# Patient Record
Sex: MALE | Race: WHITE | NOT HISPANIC OR LATINO | Employment: FULL TIME | URBAN - METROPOLITAN AREA
[De-identification: names, ages, dates, MRNs, and addresses within clinical notes are randomized per-mention and may not be internally consistent; named-entity substitution may affect disease eponyms.]

---

## 2023-04-17 ENCOUNTER — OFFICE VISIT (OUTPATIENT)
Dept: FAMILY MEDICINE | Facility: CLINIC | Age: 43
End: 2023-04-17
Payer: COMMERCIAL

## 2023-04-17 ENCOUNTER — LAB VISIT (OUTPATIENT)
Dept: LAB | Facility: HOSPITAL | Age: 43
End: 2023-04-17
Attending: FAMILY MEDICINE
Payer: COMMERCIAL

## 2023-04-17 VITALS
BODY MASS INDEX: 28.01 KG/M2 | OXYGEN SATURATION: 98 % | HEART RATE: 56 BPM | WEIGHT: 189.13 LBS | HEIGHT: 69 IN | TEMPERATURE: 99 F | SYSTOLIC BLOOD PRESSURE: 124 MMHG | DIASTOLIC BLOOD PRESSURE: 86 MMHG

## 2023-04-17 DIAGNOSIS — R53.83 FATIGUE, UNSPECIFIED TYPE: ICD-10-CM

## 2023-04-17 DIAGNOSIS — N63.41 SUBAREOLAR MASS OF RIGHT BREAST: Primary | ICD-10-CM

## 2023-04-17 DIAGNOSIS — G47.00 INSOMNIA, UNSPECIFIED TYPE: ICD-10-CM

## 2023-04-17 LAB — TSH SERPL DL<=0.005 MIU/L-ACNC: 2.14 UIU/ML (ref 0.4–4)

## 2023-04-17 PROCEDURE — 99204 PR OFFICE/OUTPT VISIT, NEW, LEVL IV, 45-59 MIN: ICD-10-PCS | Mod: S$GLB,,, | Performed by: FAMILY MEDICINE

## 2023-04-17 PROCEDURE — 3008F BODY MASS INDEX DOCD: CPT | Mod: CPTII,S$GLB,, | Performed by: FAMILY MEDICINE

## 2023-04-17 PROCEDURE — 36415 COLL VENOUS BLD VENIPUNCTURE: CPT | Mod: PO | Performed by: FAMILY MEDICINE

## 2023-04-17 PROCEDURE — 3079F DIAST BP 80-89 MM HG: CPT | Mod: CPTII,S$GLB,, | Performed by: FAMILY MEDICINE

## 2023-04-17 PROCEDURE — 3008F PR BODY MASS INDEX (BMI) DOCUMENTED: ICD-10-PCS | Mod: CPTII,S$GLB,, | Performed by: FAMILY MEDICINE

## 2023-04-17 PROCEDURE — 1159F MED LIST DOCD IN RCRD: CPT | Mod: CPTII,S$GLB,, | Performed by: FAMILY MEDICINE

## 2023-04-17 PROCEDURE — 3074F PR MOST RECENT SYSTOLIC BLOOD PRESSURE < 130 MM HG: ICD-10-PCS | Mod: CPTII,S$GLB,, | Performed by: FAMILY MEDICINE

## 2023-04-17 PROCEDURE — 84443 ASSAY THYROID STIM HORMONE: CPT | Performed by: FAMILY MEDICINE

## 2023-04-17 PROCEDURE — 99999 PR PBB SHADOW E&M-NEW PATIENT-LVL IV: CPT | Mod: PBBFAC,,, | Performed by: FAMILY MEDICINE

## 2023-04-17 PROCEDURE — 3079F PR MOST RECENT DIASTOLIC BLOOD PRESSURE 80-89 MM HG: ICD-10-PCS | Mod: CPTII,S$GLB,, | Performed by: FAMILY MEDICINE

## 2023-04-17 PROCEDURE — 84403 ASSAY OF TOTAL TESTOSTERONE: CPT | Performed by: FAMILY MEDICINE

## 2023-04-17 PROCEDURE — 99999 PR PBB SHADOW E&M-NEW PATIENT-LVL IV: ICD-10-PCS | Mod: PBBFAC,,, | Performed by: FAMILY MEDICINE

## 2023-04-17 PROCEDURE — 99204 OFFICE O/P NEW MOD 45 MIN: CPT | Mod: S$GLB,,, | Performed by: FAMILY MEDICINE

## 2023-04-17 PROCEDURE — 1159F PR MEDICATION LIST DOCUMENTED IN MEDICAL RECORD: ICD-10-PCS | Mod: CPTII,S$GLB,, | Performed by: FAMILY MEDICINE

## 2023-04-17 PROCEDURE — 3074F SYST BP LT 130 MM HG: CPT | Mod: CPTII,S$GLB,, | Performed by: FAMILY MEDICINE

## 2023-04-17 RX ORDER — MELOXICAM 15 MG/1
15 TABLET ORAL
COMMUNITY
Start: 2023-04-11

## 2023-04-17 RX ORDER — METHYLPREDNISOLONE 4 MG/1
TABLET ORAL
COMMUNITY
Start: 2023-04-11

## 2023-04-17 NOTE — PROGRESS NOTES
Subjective:       Patient ID: Braydon Cooley is a 42 y.o. male.    Chief Complaint: Establish Care (Pt c/o painful lump in chest.)      HPI Comments:       Current Outpatient Medications:     meloxicam (MOBIC) 15 MG tablet, Take 15 mg by mouth., Disp: , Rfl:     methylPREDNISolone (MEDROL DOSEPACK) 4 mg tablet, FOLLOW PACKAGE DIRECTIONS, Disp: , Rfl:     This my 1st time seeing this patient.  He would like to establish care.  He has a history of melanoma    Complains of a 2 month history of painful lump bright behind his right nipple.  No drainage.  Pain her is off and on.  Thought he had something similar when he was a teenager.      Recently had melanoma removed from his left upper arm.  Says it was all taken often there was no spread    Brought some blood work from a work physical done about 6 months ago.  Everything was okay except for mildly elevated arsenic levels.      Also complains of trouble sleeping.  Been going on for 1-2 years.  No reports of sleep apnea from his wife.  Works in long shifts has trouble sleeping at work.  But at home as well he lays awake for hours.  Feels very tired now although is generally very active.  Melatonin generally helps him fall asleep but he wakes up later.    Past medical history:  Ruptured disc repaired on C6-C7, and on L5-L6.  Vasectomy.      Family history father with sleep apnea and kidney stones.  G father with kidney cancer.  Grandmother with brain cancer.    Work physical blood work (10/22):  Blood sugar 75, uric acid 3.2, BUN 23, creatinine 1.25, normal LFTs, , normal CBC, normal urinalysis, PSA 0.7, hepatitis-C antibody negative, chest x-ray negative  Review of Systems   Constitutional:  Positive for fatigue. Negative for activity change, appetite change and fever.   HENT:  Negative for sore throat.    Respiratory:  Negative for cough and shortness of breath.    Cardiovascular:  Negative for chest pain.   Gastrointestinal:  Negative for abdominal pain, diarrhea  "and nausea.   Genitourinary:  Negative for difficulty urinating.   Musculoskeletal:  Negative for arthralgias and myalgias.   Neurological:  Negative for dizziness and headaches.   Psychiatric/Behavioral:  Positive for sleep disturbance.      Objective:      Vitals:    04/17/23 1326   BP: 124/86   Pulse: (!) 56   Temp: 98.9 °F (37.2 °C)   SpO2: 98%   Weight: 85.8 kg (189 lb 2.5 oz)   Height: 5' 9" (1.753 m)   PainSc: 0-No pain     Physical Exam  Vitals and nursing note reviewed.   Constitutional:       General: He is not in acute distress.     Appearance: He is well-developed. He is not diaphoretic.   HENT:      Head: Normocephalic.   Neck:      Thyroid: No thyromegaly.   Cardiovascular:      Rate and Rhythm: Normal rate and regular rhythm.      Heart sounds: Normal heart sounds. No murmur heard.  Pulmonary:      Effort: Pulmonary effort is normal.      Breath sounds: Normal breath sounds. No wheezing or rales.   Chest:          Comments: Palpable lump, minimally tender  No axillary or supraclavicular adenopathy  Abdominal:      General: There is no distension.      Palpations: Abdomen is soft.   Musculoskeletal:      Cervical back: Neck supple.      Right lower leg: No edema.      Left lower leg: No edema.   Lymphadenopathy:      Cervical: No cervical adenopathy.      Right cervical: No superficial or deep cervical adenopathy.     Left cervical: No superficial or deep cervical adenopathy.   Skin:     General: Skin is warm and dry.   Neurological:      Mental Status: He is alert and oriented to person, place, and time.   Psychiatric:         Mood and Affect: Mood normal.         Behavior: Behavior normal.         Thought Content: Thought content normal.         Judgment: Judgment normal.       Assessment:       1. Subareolar mass of right breast    2. Fatigue, unspecified type    3. Insomnia, unspecified type        Plan:   Subareolar mass of right breast  Comments:  Diagnostic mammogram and ultrasound  Orders:  -   "   Mammo Digital Diagnostic Right; Future; Expected date: 04/17/2023  -     US Breast Right Complete; Future; Expected date: 04/17/2023    Fatigue, unspecified type  Comments:  Recent blood work okay.  Check TSH and testosterone level today.  Most likely due to sleep deprivation  Orders:  -     TSH; Future; Expected date: 04/17/2023  -     Testosterone; Future; Expected date: 04/17/2023    Insomnia, unspecified type  Comments:  Sleep evaluation  Orders:  -     Ambulatory referral/consult to Sleep Disorders; Future; Expected date: 04/24/2023

## 2023-04-18 LAB — TESTOST SERPL-MCNC: 475 NG/DL (ref 304–1227)

## 2023-04-20 ENCOUNTER — HOSPITAL ENCOUNTER (OUTPATIENT)
Dept: RADIOLOGY | Facility: HOSPITAL | Age: 43
Discharge: HOME | End: 2023-04-20
Attending: FAMILY MEDICINE
Payer: COMMERCIAL

## 2023-04-20 ENCOUNTER — TELEPHONE (OUTPATIENT)
Dept: PULMONOLOGY | Facility: CLINIC | Age: 43
End: 2023-04-20
Payer: COMMERCIAL

## 2023-04-20 ENCOUNTER — OFFICE VISIT (OUTPATIENT)
Dept: PULMONOLOGY | Facility: CLINIC | Age: 43
End: 2023-04-20
Payer: COMMERCIAL

## 2023-04-20 VITALS
OXYGEN SATURATION: 99 % | DIASTOLIC BLOOD PRESSURE: 80 MMHG | RESPIRATION RATE: 18 BRPM | HEART RATE: 48 BPM | WEIGHT: 190.69 LBS | HEIGHT: 69 IN | BODY MASS INDEX: 28.24 KG/M2 | SYSTOLIC BLOOD PRESSURE: 124 MMHG

## 2023-04-20 DIAGNOSIS — G47.00 INSOMNIA, UNSPECIFIED TYPE: ICD-10-CM

## 2023-04-20 DIAGNOSIS — N63.41 SUBAREOLAR MASS OF RIGHT BREAST: ICD-10-CM

## 2023-04-20 DIAGNOSIS — G47.30 SLEEP-DISORDERED BREATHING: Primary | ICD-10-CM

## 2023-04-20 PROCEDURE — 1160F PR REVIEW ALL MEDS BY PRESCRIBER/CLIN PHARMACIST DOCUMENTED: ICD-10-PCS | Mod: CPTII,S$GLB,, | Performed by: PHYSICIAN ASSISTANT

## 2023-04-20 PROCEDURE — 76642 ULTRASOUND BREAST LIMITED: CPT | Mod: 26,RT,, | Performed by: RADIOLOGY

## 2023-04-20 PROCEDURE — 1159F MED LIST DOCD IN RCRD: CPT | Mod: CPTII,S$GLB,, | Performed by: PHYSICIAN ASSISTANT

## 2023-04-20 PROCEDURE — 77062 BREAST TOMOSYNTHESIS BI: CPT | Mod: TC

## 2023-04-20 PROCEDURE — 1159F PR MEDICATION LIST DOCUMENTED IN MEDICAL RECORD: ICD-10-PCS | Mod: CPTII,S$GLB,, | Performed by: PHYSICIAN ASSISTANT

## 2023-04-20 PROCEDURE — 77062 BREAST TOMOSYNTHESIS BI: CPT | Mod: 26,,, | Performed by: RADIOLOGY

## 2023-04-20 PROCEDURE — 1160F RVW MEDS BY RX/DR IN RCRD: CPT | Mod: CPTII,S$GLB,, | Performed by: PHYSICIAN ASSISTANT

## 2023-04-20 PROCEDURE — 99203 PR OFFICE/OUTPT VISIT, NEW, LEVL III, 30-44 MIN: ICD-10-PCS | Mod: S$GLB,,, | Performed by: PHYSICIAN ASSISTANT

## 2023-04-20 PROCEDURE — 3079F DIAST BP 80-89 MM HG: CPT | Mod: CPTII,S$GLB,, | Performed by: PHYSICIAN ASSISTANT

## 2023-04-20 PROCEDURE — 77066 MAMMO DIGITAL DIAGNOSTIC BILAT WITH TOMO: ICD-10-PCS | Mod: 26,,, | Performed by: RADIOLOGY

## 2023-04-20 PROCEDURE — 3008F PR BODY MASS INDEX (BMI) DOCUMENTED: ICD-10-PCS | Mod: CPTII,S$GLB,, | Performed by: PHYSICIAN ASSISTANT

## 2023-04-20 PROCEDURE — 99203 OFFICE O/P NEW LOW 30 MIN: CPT | Mod: S$GLB,,, | Performed by: PHYSICIAN ASSISTANT

## 2023-04-20 PROCEDURE — 77066 DX MAMMO INCL CAD BI: CPT | Mod: 26,,, | Performed by: RADIOLOGY

## 2023-04-20 PROCEDURE — 3079F PR MOST RECENT DIASTOLIC BLOOD PRESSURE 80-89 MM HG: ICD-10-PCS | Mod: CPTII,S$GLB,, | Performed by: PHYSICIAN ASSISTANT

## 2023-04-20 PROCEDURE — 3008F BODY MASS INDEX DOCD: CPT | Mod: CPTII,S$GLB,, | Performed by: PHYSICIAN ASSISTANT

## 2023-04-20 PROCEDURE — 3074F SYST BP LT 130 MM HG: CPT | Mod: CPTII,S$GLB,, | Performed by: PHYSICIAN ASSISTANT

## 2023-04-20 PROCEDURE — 76642 ULTRASOUND BREAST LIMITED: CPT | Mod: TC,RT

## 2023-04-20 PROCEDURE — 99999 PR PBB SHADOW E&M-EST. PATIENT-LVL IV: CPT | Mod: PBBFAC,,, | Performed by: PHYSICIAN ASSISTANT

## 2023-04-20 PROCEDURE — 3074F PR MOST RECENT SYSTOLIC BLOOD PRESSURE < 130 MM HG: ICD-10-PCS | Mod: CPTII,S$GLB,, | Performed by: PHYSICIAN ASSISTANT

## 2023-04-20 PROCEDURE — 76642 US BREAST RIGHT LIMITED: ICD-10-PCS | Mod: 26,RT,, | Performed by: RADIOLOGY

## 2023-04-20 PROCEDURE — 77062 MAMMO DIGITAL DIAGNOSTIC BILAT WITH TOMO: ICD-10-PCS | Mod: 26,,, | Performed by: RADIOLOGY

## 2023-04-20 PROCEDURE — 99999 PR PBB SHADOW E&M-EST. PATIENT-LVL IV: ICD-10-PCS | Mod: PBBFAC,,, | Performed by: PHYSICIAN ASSISTANT

## 2023-04-20 NOTE — PROGRESS NOTES
Subjective:       Patient ID: Braydon Cooley is a 42 y.o. male.    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  Insomnia for many years  3 years ago took Ambien but didn't like it  Wife tells him he snores and stops breathing  Gets in bed 9pm, 6am; trouble falling asleep, takes him hours to fall asleep  He is not tired when he goes to sleep at night  Feels tired during the day  Feels like he functions well when he is at work and during the day despite some fatigue  Takes melatonin sometimes, 10mg, helps him go to sleep but doesn't sleep through the night  No alcohol  Drinks caffeine, stops at 2pm  No restless legs  Never smoker    BP Readings from Last 3 Encounters:   04/20/23 124/80   04/17/23 124/86     Snoring / Sleep:     [place] Questionnaire (validated DILIP screening questionnaire)    yes -- Snoring/apnea    yes -- Fatigue    Body mass index is 28.16 kg/m².  (>25 is overweight, >30 is obese)    Blood Pressure = normal blood pressure  (PreHTN 120-139/80-89, Stg1 140-159/90-99, Stg2 >160/>100)  [place] = 2 of three DILIP categories are positive (high risk is 2-3 positive categories)     Moore Sleepiness Scale TOTAL =   10  (validated sleepiness questionnaire with a higher score indicating greater sleepiness; range 0-24)  EPWORTH SLEEPINESS SCALE 4/20/2023   Sitting and reading 2   Watching TV 2   Sitting, inactive in a public place (e.g. a theatre or a meeting) 2   As a passenger in a car for an hour without a break 1   Lying down to rest in the afternoon when circumstances permit 0   Sitting and talking to someone 0   Sitting quietly after a lunch without alcohol 3   In a car, while stopped for a few minutes in traffic 0   Total score 10         STOP-Bang Questionnaire (validated DILIP screening questionnaire)  Negative unless checked off.  [x] Snoring    [x]  Tired/Fatigued/Sleepy  [x] Obstruction  "(apneas/choking)  [] Pressure (HTN)  [] BMI >35  [] Age >50  [x] Neck >40 cm  [x] Gender male   STOP-Bang = 5 (low risk 0-2,high risk 3-8)    Neck circumference 17inches [?DILIP risk if >43cm (17in) male or >41cm (15.5 in) female]      There is no immunization history on file for this patient.   Tobacco Use: Low Risk     Smoking Tobacco Use: Never    Smokeless Tobacco Use: Never    Passive Exposure: Not on file      Past Medical History:   Diagnosis Date    Malignant melanoma of skin, unspecified 2022      Current Outpatient Medications on File Prior to Visit   Medication Sig Dispense Refill    meloxicam (MOBIC) 15 MG tablet Take 15 mg by mouth.      methylPREDNISolone (MEDROL DOSEPACK) 4 mg tablet FOLLOW PACKAGE DIRECTIONS       No current facility-administered medications on file prior to visit.        Review of Systems   Constitutional:  Negative for fever, weight loss, appetite change, fatigue and weakness.   HENT:  Negative for postnasal drip, rhinorrhea, sinus pressure, trouble swallowing and congestion.    Respiratory:  Positive for snoring and somnolence. Negative for cough, sputum production, choking, chest tightness, shortness of breath, wheezing and dyspnea on extertion.    Cardiovascular:  Negative for chest pain and leg swelling.   Musculoskeletal:  Negative for arthralgias, gait problem and joint swelling.   Gastrointestinal:  Negative for nausea, vomiting and abdominal pain.   Neurological:  Negative for dizziness, weakness and headaches.   Psychiatric/Behavioral:  Positive for sleep disturbance.    All other systems reviewed and are negative.    Objective:       Vitals:    04/20/23 1051   BP: 124/80   Pulse: (!) 48   Resp: 18   SpO2: 99%   Weight: 86.5 kg (190 lb 11.2 oz)   Height: 5' 9" (1.753 m)       Physical Exam   Constitutional: He is oriented to person, place, and time. He appears well-developed and well-nourished. No distress.   HENT:   Head: Normocephalic.   Nose: Nose normal.   Mouth/Throat: " Oropharynx is clear and moist. Mallampati Score: III.   Cardiovascular: Normal rate and regular rhythm.   Pulmonary/Chest: Effort normal. No respiratory distress. He has no wheezes. He has no rhonchi. He has no rales.   Musculoskeletal:         General: No edema.      Cervical back: Normal range of motion and neck supple.   Lymphadenopathy: No supraclavicular adenopathy is present.     He has no cervical adenopathy.   Neurological: He is alert and oriented to person, place, and time. Gait normal.   Skin: Skin is warm and dry.   Psychiatric: He has a normal mood and affect.   Vitals reviewed.  Personal Diagnostic Review    Mammo Digital Diagnostic Bilat with Chris, US Breast Right Limited  Narrative: Result:   Mammo Digital Diagnostic Bilat with Chris  US Breast Right Limited     History:  Patient is 42 y.o. and is seen for diagnostic imaging.    Films Compared:  Prior images (if available) were compared.     Findings:  This procedure was performed using tomosynthesis. Computer-aided detection   was utilized in the interpretation of this examination.  Right  Mammo Digital Diagnostic Bilat with Chris  There is mild gynecomastia in a dendritic pattern seen in the right   breast.     There is no evidence of suspicious masses, calcifications, or other   abnormal findings in the right breast.    US Breast Right Limited  There is mild gynecomastia in a dendritic pattern seen in the right   breast.     There is no evidence of suspicious masses or other abnormal findings in   the right breast.    Left  Mammo Digital Diagnostic Bilat with Chris  There is no evidence of suspicious masses, calcifications, or other   abnormal findings in the left breast.  Impression: Bilateral  Mammo Digital Diagnostic Bilat with Chris  There is no mammographic evidence of malignancy.    Right  US Breast Right Limited  There is no sonographic evidence of malignancy.    BI-RADS Category:   Overall: 2 - Benign       Recommendation:  Routine screening  mammogram in 1 year is recommended.          No flowsheet data found.      Assessment/Plan:       Problem List Items Addressed This Visit          Other    Sleep-disordered breathing - Primary     STOPbang 5, [place] 2, Ep 10  Home sleep study  DILIP and implications on health discussed  Follow up in 4 weeks after sleep study           Relevant Orders    Home Sleep Study    X-Ray Chest PA And Lateral    Insomnia     Suspect shift work/circadian rhythm sleep disorder  Taking melatonin  Will rule out DILIP              Follow up in about 4 weeks (around 5/18/2023) for sleep study review.    Discussed diagnosis, its evaluation, treatment and usual course. All questions answered.    Patient verbalized understanding of plan and left in no acute distress    Thank you for the courtesy of participating in the care of this patient    Siri Guerra PA-C  [facility]

## 2023-04-20 NOTE — ASSESSMENT & PLAN NOTE
[place] 5, [place] 2, Ep 10  Home sleep study  DILIP and implications on health discussed  Follow up in 4 weeks after sleep study

## 2023-04-27 ENCOUNTER — TELEPHONE (OUTPATIENT)
Dept: FAMILY MEDICINE | Facility: CLINIC | Age: 43
End: 2023-04-27
Payer: COMMERCIAL

## 2023-04-27 NOTE — TELEPHONE ENCOUNTER
"----- Message from Torres Barrow MD sent at 4/26/2023  5:45 PM CDT -----  Mammogram and ultrasound of the breast shows nothing suspicious of cancer.  Looks to be excess breast tissue called "gynecomastia".  This can be removed when uncomfortable.  Would you like me to send you to a surgeon?  "

## 2023-05-10 ENCOUNTER — HOSPITAL ENCOUNTER (OUTPATIENT)
Dept: SLEEP MEDICINE | Facility: HOSPITAL | Age: 43
Discharge: HOME | End: 2023-05-10
Attending: PHYSICIAN ASSISTANT
Payer: COMMERCIAL

## 2023-05-10 DIAGNOSIS — G47.33 OSA (OBSTRUCTIVE SLEEP APNEA): Primary | ICD-10-CM

## 2023-05-10 DIAGNOSIS — G47.30 SLEEP-DISORDERED BREATHING: ICD-10-CM

## 2023-05-10 PROCEDURE — 95806 SLEEP STUDY UNATT&RESP EFFT: CPT | Performed by: INTERNAL MEDICINE

## 2023-05-10 NOTE — Clinical Note
2 night study MILD OBSTRUCTIVE SLEEP APNEA with overall AHI 12.5/hr (49 events): night #1 Oxygen desaturation: 90%. SpO2 between 90% to 94% for 1 hr 56 min. Patient snored 84% time above 50 . Heart rate range: 41 bpm - 68 bpm REC's: Therapy with APAP at 4-20 cm WP using mask of choice with heated humidification is an option. Please refer to sleep disorders clinic Weight loss/management. with regular exercise per direction of physician. Avoid drowsy driving. Follow up in sleep clinic to maximize adherence and ensure resolution of symptoms.

## 2023-05-12 PROCEDURE — 95806 SLEEP STUDY UNATT&RESP EFFT: CPT | Mod: 26,,, | Performed by: INTERNAL MEDICINE

## 2023-05-12 PROCEDURE — 95806 PR SLEEP STUDY, UNATTENDED, SIMUL RECORD HR/O2 SAT/RESP FLOW/RESP EFFT: ICD-10-PCS | Mod: 26,,, | Performed by: INTERNAL MEDICINE

## 2023-05-12 NOTE — PROCEDURES
"2 night study  MILD OBSTRUCTIVE SLEEP APNEA with overall AHI 12.5/hr (49 events): night #1  Oxygen desaturation: 90%. SpO2 between 90% to 94% for 1 hr 56 min.  Patient snored 84% time above 50 .  Heart rate range: 41 bpm - 68 bpm  REC's:  Therapy with APAP at 4-20 cm WP using mask of choice with heated humidification is an option.  Please refer to sleep disorders clinic  Weight loss/management. with regular exercise per direction of physician.  Avoid drowsy driving.  Follow up in sleep clinic to maximize adherence and ensure resolution of symptoms.    Dear Siri Guerra PA-C  [address] [place] [address] [place] [id number]/Torres Barrow MD         The sleep study that you ordered is complete.  You have ordered sleep LAB services to perform the sleep study for Braydon Cooley .      Please find Sleep Study result in  the "Media tab" of Chart Review menu.        You can look  for the report in the  Media by the document type "Sleep Study Documents". Alphabetizing  "Document type" column helps to find the SLEEP STUDY report  Faster.       As the ordering provider, you are responsible for reviewing the results and implementing a treatment plan with your patient.    If you need a Sleep Medicine provider to explain the sleep study findings and arrange treatment for the patient, please refer patient for consultation to our Sleep Clinic via [facility] with Ambulatory Consult Sleep.     To do that please place an order for an  "Ambulatory Consult Sleep" -  order , it will go to our clinic work queue for our staff  to contact the patient for an appointment.      For any questions, please contact our sleep lab  staff at [phone] to talk to clinical staff          Rayshawn De La Cruz MD   "

## 2023-07-17 ENCOUNTER — OFFICE VISIT (OUTPATIENT)
Dept: PULMONOLOGY | Facility: CLINIC | Age: 43
End: 2023-07-17
Payer: COMMERCIAL

## 2023-07-17 VITALS
RESPIRATION RATE: 17 BRPM | SYSTOLIC BLOOD PRESSURE: 118 MMHG | OXYGEN SATURATION: 98 % | DIASTOLIC BLOOD PRESSURE: 66 MMHG | HEIGHT: 69 IN | HEART RATE: 60 BPM | BODY MASS INDEX: 28.26 KG/M2 | WEIGHT: 190.81 LBS

## 2023-07-17 DIAGNOSIS — G47.33 OSA (OBSTRUCTIVE SLEEP APNEA): Primary | ICD-10-CM

## 2023-07-17 DIAGNOSIS — G47.09 OTHER INSOMNIA: ICD-10-CM

## 2023-07-17 PROCEDURE — 1160F PR REVIEW ALL MEDS BY PRESCRIBER/CLIN PHARMACIST DOCUMENTED: ICD-10-PCS | Mod: CPTII,S$GLB,, | Performed by: PHYSICIAN ASSISTANT

## 2023-07-17 PROCEDURE — 99999 PR PBB SHADOW E&M-EST. PATIENT-LVL III: ICD-10-PCS | Mod: PBBFAC,,, | Performed by: PHYSICIAN ASSISTANT

## 2023-07-17 PROCEDURE — 1159F PR MEDICATION LIST DOCUMENTED IN MEDICAL RECORD: ICD-10-PCS | Mod: CPTII,S$GLB,, | Performed by: PHYSICIAN ASSISTANT

## 2023-07-17 PROCEDURE — 1160F RVW MEDS BY RX/DR IN RCRD: CPT | Mod: CPTII,S$GLB,, | Performed by: PHYSICIAN ASSISTANT

## 2023-07-17 PROCEDURE — 99213 OFFICE O/P EST LOW 20 MIN: CPT | Mod: S$GLB,,, | Performed by: PHYSICIAN ASSISTANT

## 2023-07-17 PROCEDURE — 3008F PR BODY MASS INDEX (BMI) DOCUMENTED: ICD-10-PCS | Mod: CPTII,S$GLB,, | Performed by: PHYSICIAN ASSISTANT

## 2023-07-17 PROCEDURE — 3074F PR MOST RECENT SYSTOLIC BLOOD PRESSURE < 130 MM HG: ICD-10-PCS | Mod: CPTII,S$GLB,, | Performed by: PHYSICIAN ASSISTANT

## 2023-07-17 PROCEDURE — 3074F SYST BP LT 130 MM HG: CPT | Mod: CPTII,S$GLB,, | Performed by: PHYSICIAN ASSISTANT

## 2023-07-17 PROCEDURE — 3008F BODY MASS INDEX DOCD: CPT | Mod: CPTII,S$GLB,, | Performed by: PHYSICIAN ASSISTANT

## 2023-07-17 PROCEDURE — 99213 PR OFFICE/OUTPT VISIT, EST, LEVL III, 20-29 MIN: ICD-10-PCS | Mod: S$GLB,,, | Performed by: PHYSICIAN ASSISTANT

## 2023-07-17 PROCEDURE — 3078F DIAST BP <80 MM HG: CPT | Mod: CPTII,S$GLB,, | Performed by: PHYSICIAN ASSISTANT

## 2023-07-17 PROCEDURE — 3078F PR MOST RECENT DIASTOLIC BLOOD PRESSURE < 80 MM HG: ICD-10-PCS | Mod: CPTII,S$GLB,, | Performed by: PHYSICIAN ASSISTANT

## 2023-07-17 PROCEDURE — 1159F MED LIST DOCD IN RCRD: CPT | Mod: CPTII,S$GLB,, | Performed by: PHYSICIAN ASSISTANT

## 2023-07-17 PROCEDURE — 99999 PR PBB SHADOW E&M-EST. PATIENT-LVL III: CPT | Mod: PBBFAC,,, | Performed by: PHYSICIAN ASSISTANT

## 2023-07-17 NOTE — ASSESSMENT & PLAN NOTE
Mild DILIP AHI 12  Start CPAP with nasal mask  Discussed therapeutic goals for CPAP: Ideal usage 100% of nights for 6-8 hours per night. Minimum usage is 70% of night for at least 4 hours per night.

## 2023-07-17 NOTE — PROGRESS NOTES
Contacted pt to convey results. Patient verbalized understanding and agreement with plan of care.     Subjective:       Patient ID: Braydon Cooley is a 42 y.o. male.    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  Insomnia for many years  3 years ago took Ambien but didn't like it  Wife tells him he snores and stops breathing  Gets in bed 9pm, 6am; trouble falling asleep, takes him hours to fall asleep  He is not tired when he goes to sleep at night  Feels tired during the day  Feels like he functions well when he is at work and during the day despite some fatigue  Takes melatonin sometimes, 10mg, helps him go to sleep but doesn't sleep through the night  No alcohol  Drinks caffeine, stops at 2pm  No restless legs  Never smoker    BP Readings from Last 3 Encounters:   07/17/23 118/66   04/20/23 124/80   04/17/23 124/86     Snoring / Sleep:     [place]ora Questionnaire (validated DILIP screening questionnaire)    yes -- Snoring/apnea    yes -- Fatigue    Body mass index is 28.18 kg/m².  (>25 is overweight, >30 is obese)    Blood Pressure = normal blood pressure  (PreHTN 120-139/80-89, Stg1 140-159/90-99, Stg2 >160/>100)  [place]ora = 2 of three DILIP categories are positive (high risk is 2-3 positive categories)     [place] Sleepiness Scale TOTAL =   10  (validated sleepiness questionnaire with a higher score indicating greater sleepiness; range 0-24)  EPWORTH SLEEPINESS SCALE 4/20/2023   Sitting and reading 2   Watching TV 2   Sitting, inactive in a public place (e.g. a theatre or a meeting) 2   As a passenger in a car for an hour without a break 1   Lying down to rest in the afternoon when circumstances permit 0   Sitting and talking to someone 0   Sitting quietly after a lunch without alcohol 3   In a car, while stopped for a few minutes in traffic 0   Total score 10  "        STOP-Bang Questionnaire (validated DILIP screening questionnaire)  Negative unless checked off.  [x] Snoring    [x]  Tired/Fatigued/Sleepy  [x] Obstruction (apneas/choking)  [] Pressure (HTN)  [] BMI >35  [] Age >50  [x] Neck >40 cm  [x] Gender male   STOP-Bang = 5 (low risk 0-2,high risk 3-8)    Neck circumference 17inches [?DILIP risk if >43cm (17in) male or >41cm (15.5 in) female]          There is no immunization history on file for this patient.   Tobacco Use: Low Risk     Smoking Tobacco Use: Never    Smokeless Tobacco Use: Never    Passive Exposure: Not on file      Past Medical History:   Diagnosis Date    Malignant melanoma of skin, unspecified 2022      Current Outpatient Medications on File Prior to Visit   Medication Sig Dispense Refill    meloxicam (MOBIC) 15 MG tablet Take 15 mg by mouth.      methylPREDNISolone (MEDROL DOSEPACK) 4 mg tablet FOLLOW PACKAGE DIRECTIONS       No current facility-administered medications on file prior to visit.        Review of Systems   Constitutional:  Negative for fever, weight loss, appetite change, fatigue and weakness.   HENT:  Negative for postnasal drip, rhinorrhea, sinus pressure, trouble swallowing and congestion.    Respiratory:  Positive for snoring and somnolence. Negative for cough, sputum production, choking, chest tightness, shortness of breath, wheezing and dyspnea on extertion.    Cardiovascular:  Negative for chest pain and leg swelling.   Musculoskeletal:  Negative for arthralgias, gait problem and joint swelling.   Gastrointestinal:  Negative for nausea, vomiting and abdominal pain.   Neurological:  Negative for dizziness, weakness and headaches.   Psychiatric/Behavioral:  Positive for sleep disturbance.    All other systems reviewed and are negative.    Objective:       Vitals:    07/17/23 1403   BP: 118/66   Pulse: 60   Resp: 17   SpO2: 98%   Weight: 86.5 kg (190 lb 12.9 oz)   Height: 5' 9" (1.753 m)       Physical Exam   Constitutional: He is " oriented to person, place, and time. He appears well-developed and well-nourished. No distress.   HENT:   Head: Normocephalic.   Nose: Nose normal.   Mouth/Throat: Oropharynx is clear and moist. Mallampati Score: III.   Cardiovascular: Normal rate and regular rhythm.   Pulmonary/Chest: Effort normal. No respiratory distress. He has no wheezes. He has no rhonchi. He has no rales.   Musculoskeletal:         General: No edema.      Cervical back: Normal range of motion and neck supple.   Lymphadenopathy: No supraclavicular adenopathy is present.     He has no cervical adenopathy.   Neurological: He is alert and oriented to person, place, and time. Gait normal.   Skin: Skin is warm and dry.   Psychiatric: He has a normal mood and affect.   Vitals reviewed.  Personal Diagnostic Review    2 night study  MILD OBSTRUCTIVE SLEEP APNEA with overall AHI 12.5/hr (49 events): night #1  Oxygen desaturation: 90%. SpO2 between 90% to 94% for 1 hr 56 min.  Patient snored 84% time above 50 .  Heart rate range: 41 bpm - 68 bpm          Assessment/Plan:       Problem List Items Addressed This Visit          Other    Insomnia     Suspect shift work/circadian rhythm sleep disorder  Taking melatonin  CPAP for DILIP         DILIP (obstructive sleep apnea) - Primary     Mild DILIP AHI 12  Start CPAP with nasal mask  Discussed therapeutic goals for CPAP: Ideal usage 100% of nights for 6-8 hours per night. Minimum usage is 70% of night for at least 4 hours per night.           Relevant Orders    CPAP FOR HOME USE       Follow up in about 3 months (around 10/17/2023) for new cpap dl.    Discussed diagnosis, its evaluation, treatment and usual course. All questions answered.    Patient verbalized understanding of plan and left in no acute distress    Thank you for the courtesy of participating in the care of this patient    WOJCIECH [facility]

## 2024-11-14 ENCOUNTER — PATIENT OUTREACH (OUTPATIENT)
Dept: ADMINISTRATIVE | Facility: HOSPITAL | Age: 44
End: 2024-11-14
Payer: COMMERCIAL

## 2024-11-14 NOTE — PROGRESS NOTES
Working PCP not seen in 12 months report; Called patient to schedule - Spoke with patient who states that he will get with his wife and schedule through the portal.